# Patient Record
Sex: FEMALE | URBAN - METROPOLITAN AREA
[De-identification: names, ages, dates, MRNs, and addresses within clinical notes are randomized per-mention and may not be internally consistent; named-entity substitution may affect disease eponyms.]

---

## 2018-08-20 ENCOUNTER — APPOINTMENT (OUTPATIENT)
Age: 33
Setting detail: DERMATOLOGY
End: 2018-08-20

## 2018-08-20 DIAGNOSIS — L98.8 OTHER SPECIFIED DISORDERS OF THE SKIN AND SUBCUTANEOUS TISSUE: ICD-10-CM

## 2018-08-20 PROCEDURE — OTHER BOTOX: OTHER

## 2018-08-20 NOTE — PROCEDURE: BOTOX
Lot #: [id number]
Expiration Date (Month Year): 12/2020
Additional Area 5 Units: 0
Consent: Written consent obtained. Risks include but not limited to lid/brow ptosis, bruising, swelling, diplopia, temporary effect, incomplete chemical denervation.
Forehead Units: 6
Additional Area 2 Location: chin
Detail Level: Simple
Post-Care Instructions: Patient instructed to not lie down for 4 hours and limit physical activity for 24 hours. Patient instructed not to travel by airplane for 48 hours.
Dilution (U/0.1 Cc): 2.5
Glabellar Complex Units: 20
Additional Area 1 Location: upper lip

## 2018-09-27 ENCOUNTER — APPOINTMENT (OUTPATIENT)
Age: 33
Setting detail: DERMATOLOGY
End: 2018-09-27

## 2018-09-27 DIAGNOSIS — L90.5 SCAR CONDITIONS AND FIBROSIS OF SKIN: ICD-10-CM

## 2018-09-27 PROCEDURE — OTHER CHEMICAL PEEL: OTHER

## 2018-09-27 ASSESSMENT — LOCATION SIMPLE DESCRIPTION DERM
LOCATION SIMPLE: SUPERIOR FOREHEAD
LOCATION SIMPLE: NOSE
LOCATION SIMPLE: CHIN
LOCATION SIMPLE: LEFT CHEEK
LOCATION SIMPLE: RIGHT CHEEK

## 2018-09-27 ASSESSMENT — LOCATION DETAILED DESCRIPTION DERM
LOCATION DETAILED: RIGHT INFERIOR CENTRAL MALAR CHEEK
LOCATION DETAILED: NASAL ROOT
LOCATION DETAILED: SUPERIOR MID FOREHEAD
LOCATION DETAILED: LEFT CENTRAL MALAR CHEEK
LOCATION DETAILED: LEFT CHIN

## 2018-09-27 ASSESSMENT — LOCATION ZONE DERM
LOCATION ZONE: NOSE
LOCATION ZONE: FACE

## 2018-09-27 NOTE — PROCEDURE: CHEMICAL PEEL
Comments: Signature peel 2cc with arbutase 1 1/2 cc added
Number Of Layers: 2
Post Peel Care: After the procedure, post-care instructions were reviewed with the patient. Sunscreen applied
Chemical Peel: Brightening Peel
Booster Applied In Office?: yes
Post-Care Instructions: I reviewed with the patient in detail post-care instructions. Patient should avoid sun exposure and wear sun protection.
Prep: Vasoline to corners of mouth/ eyes/nose. Sunscreen applied. Post care instructions givem
Booster To Be Used At Home?: no
Price (Use Numbers Only, No Special Characters Or $): 125.00
Detail Level: Detailed
Consent: Prior to the procedure, written consent was obtained and risks were reviewed, including but not limited to: redness, peeling, blistering, pigmentary change, scarring, infection, and pain.
Treatment Time (Optional): 10 min
Treatment Number: 1

## 2018-10-15 ENCOUNTER — APPOINTMENT (OUTPATIENT)
Age: 33
Setting detail: DERMATOLOGY
End: 2018-10-15

## 2018-10-15 DIAGNOSIS — Z41.9 ENCOUNTER FOR PROCEDURE FOR PURPOSES OTHER THAN REMEDYING HEALTH STATE, UNSPECIFIED: ICD-10-CM

## 2018-10-15 PROCEDURE — 99211 OFF/OP EST MAY X REQ PHY/QHP: CPT

## 2018-10-15 PROCEDURE — OTHER ADDITIONAL NOTES: OTHER

## 2018-11-01 ENCOUNTER — APPOINTMENT (OUTPATIENT)
Age: 33
Setting detail: DERMATOLOGY
End: 2018-11-01

## 2018-11-01 DIAGNOSIS — L90.5 SCAR CONDITIONS AND FIBROSIS OF SKIN: ICD-10-CM

## 2018-11-01 PROCEDURE — OTHER CHEMICAL PEEL: OTHER

## 2018-11-01 ASSESSMENT — LOCATION SIMPLE DESCRIPTION DERM
LOCATION SIMPLE: LEFT FOREHEAD
LOCATION SIMPLE: LEFT CHEEK
LOCATION SIMPLE: RIGHT CHEEK
LOCATION SIMPLE: RIGHT LIP
LOCATION SIMPLE: NOSE

## 2018-11-01 ASSESSMENT — LOCATION DETAILED DESCRIPTION DERM
LOCATION DETAILED: RIGHT INFERIOR LATERAL MALAR CHEEK
LOCATION DETAILED: LEFT MEDIAL FOREHEAD
LOCATION DETAILED: LEFT INFERIOR CENTRAL MALAR CHEEK
LOCATION DETAILED: NASAL DORSUM
LOCATION DETAILED: RIGHT LOWER CUTANEOUS LIP

## 2018-11-01 ASSESSMENT — LOCATION ZONE DERM
LOCATION ZONE: LIP
LOCATION ZONE: NOSE
LOCATION ZONE: FACE

## 2018-11-07 ENCOUNTER — APPOINTMENT (OUTPATIENT)
Age: 33
Setting detail: DERMATOLOGY
End: 2018-11-07

## 2018-11-07 DIAGNOSIS — Z41.9 ENCOUNTER FOR PROCEDURE FOR PURPOSES OTHER THAN REMEDYING HEALTH STATE, UNSPECIFIED: ICD-10-CM

## 2018-11-07 PROCEDURE — 99211 OFF/OP EST MAY X REQ PHY/QHP: CPT

## 2018-11-07 PROCEDURE — OTHER ADDITIONAL NOTES: OTHER

## 2018-11-07 NOTE — PROCEDURE: ADDITIONAL NOTES
Detail Level: Simple
Additional Notes: 1 week post peel. Good outcome. Pt pleased.  Discussed re evaluation in 3 more weeks.

## 2018-11-19 ENCOUNTER — APPOINTMENT (OUTPATIENT)
Age: 33
Setting detail: DERMATOLOGY
End: 2018-11-19

## 2018-11-19 DIAGNOSIS — L98.8 OTHER SPECIFIED DISORDERS OF THE SKIN AND SUBCUTANEOUS TISSUE: ICD-10-CM

## 2018-11-19 PROCEDURE — OTHER BOTOX: OTHER

## 2022-01-14 ENCOUNTER — APPOINTMENT (OUTPATIENT)
Dept: URBAN - METROPOLITAN AREA CLINIC 265 | Age: 37
Setting detail: DERMATOLOGY
End: 2022-07-11

## 2022-01-14 DIAGNOSIS — L98.8 OTHER SPECIFIED DISORDERS OF THE SKIN AND SUBCUTANEOUS TISSUE: ICD-10-CM

## 2022-01-14 PROCEDURE — OTHER BOTOX: OTHER

## 2022-06-01 NOTE — PROCEDURE: CHEMICAL PEEL
Heart has been racing for a few days. Its prolonged. Chest pain and extremity numbness. Pt stated her chest pain is associated with the heart racing. Chills in her legs and mainly in her left arm which is not normal. Lightheaded. Sob at rest. Woke up in the middle of the night for the last 3 days. Chest pain has been present longer than 5 min. Pt stated it feels like her heart is to big for the area its in. Pt stated its crushing pressure-like. Care advice recommend pt call 911. Pt refused. Pt stated she doesn't think its that serious she is asking to come see Md. Informed pt that based off what she said the care advice recommends she call 911 and I could not schedule her an appointment. Pt stated she will get her mom to bring her to the Er. Please call pt with additional care advice.     Reason for Disposition   Chest pain lasting longer than 5 minutes and ANY of the following:* Over 44 years old* Over 30 years old and at least one cardiac risk factor (e.g., diabetes mellitus, high blood pressure, high cholesterol, smoker, or strong family history of heart disease)* History of heart disease (i.e., angina, heart attack, heart failure, bypass surgery, takes nitroglycerin)* Pain is crushing, pressure-like, or heavy    Additional Information   Negative: SEVERE difficulty breathing (e.g., struggling for each breath, speaks in single words)   Negative: Passed out (i.e., fainted, collapsed and was not responding)   Negative: Difficult to awaken or acting confused (e.g., disoriented, slurred speech)   Negative: Shock suspected (e.g., cold/pale/clammy skin, too weak to stand, low BP, rapid pulse)    Protocols used: CHEST PAIN-A-OH      
Post Peel Care: After the procedure, post-care instructions were reviewed with the patient. Sunscreen applied
Chemical Peel: Brightening Peel
Prep: Vasoline to corners of mouth/ eyes/nose. Sunscreen applied. Post care instructions given.
Booster Applied In Office?: yes
Comments: Signature peel with 1.5cc arbutase added.
Consent: Prior to the procedure, written consent was obtained and risks were reviewed, including but not limited to: redness, peeling, blistering, pigmentary change, scarring, infection, and pain.
Post-Care Instructions: I reviewed with the patient in detail post-care instructions. Patient should avoid sun exposure and wear sun protection.
Treatment Time (Optional): 10 min
Number Of Layers: 2
Treatment Number: 0
Price (Use Numbers Only, No Special Characters Or $): 125.00
Detail Level: Detailed

## 2023-03-06 ENCOUNTER — APPOINTMENT (OUTPATIENT)
Dept: URBAN - METROPOLITAN AREA CLINIC 265 | Age: 38
Setting detail: DERMATOLOGY
End: 2023-03-07

## 2023-03-06 DIAGNOSIS — L98.8 OTHER SPECIFIED DISORDERS OF THE SKIN AND SUBCUTANEOUS TISSUE: ICD-10-CM

## 2023-03-06 PROCEDURE — OTHER ADDITIONAL NOTES: OTHER

## 2023-03-06 NOTE — PROCEDURE: ADDITIONAL NOTES
Additional Notes: [employer] 36 units lot number [id number] exp 03/2025 Additional Notes: [employer] 36 units lot number [id number] exp 03/2025